# Patient Record
Sex: FEMALE | Race: WHITE | NOT HISPANIC OR LATINO | Employment: OTHER | URBAN - METROPOLITAN AREA
[De-identification: names, ages, dates, MRNs, and addresses within clinical notes are randomized per-mention and may not be internally consistent; named-entity substitution may affect disease eponyms.]

---

## 2022-04-26 ENCOUNTER — ESTABLISHED PATIENT (OUTPATIENT)
Dept: URBAN - METROPOLITAN AREA CLINIC 20 | Facility: CLINIC | Age: 82
End: 2022-04-26

## 2022-04-26 DIAGNOSIS — H35.3131: ICD-10-CM

## 2022-04-26 DIAGNOSIS — H16.223: ICD-10-CM

## 2022-04-26 DIAGNOSIS — H35.722: ICD-10-CM

## 2022-04-26 PROCEDURE — 92014 COMPRE OPH EXAM EST PT 1/>: CPT

## 2022-04-26 PROCEDURE — 92134 CPTRZ OPH DX IMG PST SGM RTA: CPT

## 2022-04-26 ASSESSMENT — TONOMETRY
OD_IOP_MMHG: 13
OS_IOP_MMHG: 14

## 2022-04-26 ASSESSMENT — VISUAL ACUITY
OU_CC: J2
OD_CC: 20/20-1
OS_CC: 20/30-2

## 2022-04-26 NOTE — PATIENT DISCUSSION
[facility] monitoring, AREDS2 vitamins, no smoking, green leafy vegetables discussed.

## 2022-10-28 ENCOUNTER — FOLLOW UP (OUTPATIENT)
Dept: URBAN - METROPOLITAN AREA CLINIC 20 | Facility: CLINIC | Age: 82
End: 2022-10-28

## 2022-10-28 DIAGNOSIS — D23.30: ICD-10-CM

## 2022-10-28 DIAGNOSIS — H52.4: ICD-10-CM

## 2022-10-28 DIAGNOSIS — H35.722: ICD-10-CM

## 2022-10-28 DIAGNOSIS — H43.812: ICD-10-CM

## 2022-10-28 DIAGNOSIS — H35.3131: ICD-10-CM

## 2022-10-28 PROCEDURE — 92014 COMPRE OPH EXAM EST PT 1/>: CPT

## 2022-10-28 PROCEDURE — 92250 FUNDUS PHOTOGRAPHY W/I&R: CPT

## 2022-10-28 PROCEDURE — 92285 EXTERNAL OCULAR PHOTOGRAPHY: CPT

## 2022-10-28 ASSESSMENT — VISUAL ACUITY
OS_CC: 20/50+3
OD_CC: 20/30+2
OU_CC: 20/20-2

## 2022-10-28 ASSESSMENT — TONOMETRY
OS_IOP_MMHG: 14
OD_IOP_MMHG: 11

## 2022-10-28 NOTE — PATIENT DISCUSSION
[facility] monitoring, AREDS2 vitamins, no smoking, green leafy vegetables discussed.

## 2022-11-11 ENCOUNTER — ESTABLISHED PATIENT (OUTPATIENT)
Dept: URBAN - METROPOLITAN AREA CLINIC 20 | Facility: CLINIC | Age: 82
End: 2022-11-11

## 2022-11-11 DIAGNOSIS — H16.223: ICD-10-CM

## 2022-11-11 DIAGNOSIS — H35.722: ICD-10-CM

## 2022-11-11 DIAGNOSIS — H35.3131: ICD-10-CM

## 2022-11-11 DIAGNOSIS — H52.4: ICD-10-CM

## 2022-11-11 DIAGNOSIS — H02.88B: ICD-10-CM

## 2022-11-11 DIAGNOSIS — D23.30: ICD-10-CM

## 2022-11-11 DIAGNOSIS — H02.88A: ICD-10-CM

## 2022-11-11 DIAGNOSIS — H43.812: ICD-10-CM

## 2022-11-11 PROCEDURE — 92015 DETERMINE REFRACTIVE STATE: CPT

## 2022-11-11 PROCEDURE — 99212 OFFICE O/P EST SF 10 MIN: CPT

## 2022-11-11 ASSESSMENT — KERATOMETRY
OD_AXISANGLE2_DEGREES: 148
OD_AXISANGLE_DEGREES: 58
OS_AXISANGLE_DEGREES: 34
OS_AXISANGLE2_DEGREES: 124
OD_K1POWER_DIOPTERS: 41.25
OD_K2POWER_DIOPTERS: 42.00
OS_K1POWER_DIOPTERS: 41.25
OS_K2POWER_DIOPTERS: 41.75

## 2022-11-11 ASSESSMENT — TONOMETRY
OS_IOP_MMHG: 11
OD_IOP_MMHG: 11

## 2022-11-11 ASSESSMENT — VISUAL ACUITY
OD_CC: 20/20-3
OU_CC: 20/20
OS_CC: 20/60-1

## 2022-12-09 ENCOUNTER — FOLLOW UP (OUTPATIENT)
Dept: URBAN - METROPOLITAN AREA CLINIC 20 | Facility: CLINIC | Age: 82
End: 2022-12-09

## 2022-12-09 PROCEDURE — 11441 EXC FACE-MM B9+MARG 0.6-1 CM: CPT

## 2022-12-09 PROCEDURE — 99214 OFFICE O/P EST MOD 30 MIN: CPT

## 2022-12-09 PROCEDURE — 92285 EXTERNAL OCULAR PHOTOGRAPHY: CPT

## 2022-12-09 ASSESSMENT — TONOMETRY
OS_IOP_MMHG: 14
OD_IOP_MMHG: 12

## 2022-12-09 ASSESSMENT — VISUAL ACUITY
OD_SC: 20/20-2
OS_SC: 20/50+1

## 2022-12-09 NOTE — PATIENT DISCUSSION
[facility] monitoring, AREDS2 vitamins, no smoking, green leafy vegetables discussed.

## 2022-12-09 NOTE — PROCEDURE NOTE: CLINICAL
PROCEDURE NOTE: Excision Benign Lesion Including Margins, except Skin Tag, Face, Ears, Eyelids, Nose, Lips, Mucous Membrane; Excised Diameter 0.6 to 1.0 cm Left Upper Lid. Diagnosis: Facial Lesion, Benign. Anesthesia: Subconjunctival Lidocaine. The patient, procedure, and the correct site were identified. Prior to treatment, the risks/benefits/alternatives were discussed. The patient wished to proceed with procedure. After the risks, benefits, and alternatives were explained to the patient, informed consent was obtained. Topical proparacaine drops were instilled in the eye. 2% Lidocaine jelly was instilled in the eye. The eyelid skin was prepped with an alcohol wipe. A subcutaneous injection of 2% lidocaine with 1:100,000 epinephrine was administered via a TB syringe and 27 g needle. A reasonable period of time was allowed for anesthesia to take effect. A clamp was placed on the eyelid. The lesion was grasped with forceps and excised with scissors while taking care to preserve as much of the tarsus and lid margin as reasonably possible while still permitting an adequate and sizeable specimen for pathology review and confirmation of clean margins. The patient tolerated the procedure well and left the room in good condition. Post-procedurally, wound care instructions were given to the patient. Mary Ann Wheatley

## 2023-05-12 ENCOUNTER — ESTABLISHED PATIENT (OUTPATIENT)
Dept: URBAN - METROPOLITAN AREA CLINIC 20 | Facility: CLINIC | Age: 83
End: 2023-05-12

## 2023-05-12 DIAGNOSIS — H35.3131: ICD-10-CM

## 2023-05-12 DIAGNOSIS — H35.722: ICD-10-CM

## 2023-05-12 PROCEDURE — 99214 OFFICE O/P EST MOD 30 MIN: CPT

## 2023-05-12 PROCEDURE — 92134 CPTRZ OPH DX IMG PST SGM RTA: CPT

## 2023-05-12 ASSESSMENT — TONOMETRY
OD_IOP_MMHG: 12
OS_IOP_MMHG: 13

## 2023-05-12 ASSESSMENT — VISUAL ACUITY
OS_CC: 20/50+1
OD_CC: 20/20-1

## 2024-02-12 ENCOUNTER — ESTABLISHED PATIENT (OUTPATIENT)
Dept: URBAN - METROPOLITAN AREA CLINIC 20 | Facility: CLINIC | Age: 84
End: 2024-02-12

## 2024-02-12 DIAGNOSIS — H35.3131: ICD-10-CM

## 2024-02-12 PROCEDURE — 92134 CPTRZ OPH DX IMG PST SGM RTA: CPT

## 2024-02-12 PROCEDURE — 99214 OFFICE O/P EST MOD 30 MIN: CPT

## 2024-02-12 ASSESSMENT — TONOMETRY
OD_IOP_MMHG: 8
OS_IOP_MMHG: 9

## 2024-02-12 ASSESSMENT — VISUAL ACUITY
OS_SC: 20/60-2
OD_SC: 20/30-2

## 2024-10-01 ENCOUNTER — CONTACT LENSES/GLASSES VISIT (OUTPATIENT)
Dept: URBAN - METROPOLITAN AREA CLINIC 20 | Facility: CLINIC | Age: 84
End: 2024-10-01

## 2024-10-01 DIAGNOSIS — H52.4: ICD-10-CM

## 2024-10-01 PROCEDURE — 92015 DETERMINE REFRACTIVE STATE: CPT

## 2024-11-11 ENCOUNTER — FOLLOW UP (OUTPATIENT)
Dept: URBAN - METROPOLITAN AREA CLINIC 20 | Facility: CLINIC | Age: 84
End: 2024-11-11

## 2024-11-11 DIAGNOSIS — H35.3131: ICD-10-CM

## 2024-11-11 DIAGNOSIS — H16.223: ICD-10-CM

## 2024-11-11 DIAGNOSIS — H43.813: ICD-10-CM

## 2024-11-11 PROCEDURE — 92134 CPTRZ OPH DX IMG PST SGM RTA: CPT

## 2024-11-11 PROCEDURE — 99214 OFFICE O/P EST MOD 30 MIN: CPT
